# Patient Record
Sex: FEMALE | Race: WHITE | URBAN - METROPOLITAN AREA
[De-identification: names, ages, dates, MRNs, and addresses within clinical notes are randomized per-mention and may not be internally consistent; named-entity substitution may affect disease eponyms.]

---

## 2022-09-23 ENCOUNTER — OFFICE VISIT (OUTPATIENT)
Dept: URBAN - METROPOLITAN AREA CLINIC 30 | Facility: CLINIC | Age: 32
End: 2022-09-23
Payer: COMMERCIAL

## 2022-09-23 DIAGNOSIS — D35.2 BENIGN NEOPLASM OF PITUITARY GLAND: Primary | ICD-10-CM

## 2022-09-23 DIAGNOSIS — H52.13 MYOPIA, BILATERAL: ICD-10-CM

## 2022-09-23 DIAGNOSIS — H40.053 OCULAR HYPERTENSION, BILATERAL: ICD-10-CM

## 2022-09-23 PROCEDURE — 92004 COMPRE OPH EXAM NEW PT 1/>: CPT | Performed by: OPTOMETRIST

## 2022-09-23 ASSESSMENT — VISUAL ACUITY
OD: 20/20
OS: 20/25

## 2022-09-23 ASSESSMENT — INTRAOCULAR PRESSURE
OD: 14
OS: 17
OS: 13
OD: 17

## 2022-09-23 ASSESSMENT — KERATOMETRY
OD: 46.28
OS: 46.63

## 2022-09-23 NOTE — IMPRESSION/PLAN
Impression: Ocular hypertension, bilateral: H40.053. -FHx of glc Plan: Was told of elevated IOP elsewhere. IOP WNL at 17 OU today. Optic discs are healthy. Has used Flonase daily-rec d/c or limit use, can raise IOP. No signs of glaucoma. Rec IOP check in few months back in [address] [place] [address].

## 2022-09-23 NOTE — IMPRESSION/PLAN
Impression: Benign neoplasm of pituitary gland: D35.2. Plan: Notes the mass has gotten smaller the past few years. Diagnosed age 34. Notes neurosurgery was never recommended. Will see neurologist soon. Has had VF testing elsewhere- [facility].

## 2022-09-23 NOTE — IMPRESSION/PLAN
Impression: Myopia, bilateral: H52.13. Plan: Pt expressed interest in surgical correction options. LASIK and ICL discussed in detail today. Schedule consult c [facility] prn. Defers for now. Will contact prn.